# Patient Record
Sex: FEMALE | Race: WHITE | NOT HISPANIC OR LATINO | Employment: FULL TIME | URBAN - METROPOLITAN AREA
[De-identification: names, ages, dates, MRNs, and addresses within clinical notes are randomized per-mention and may not be internally consistent; named-entity substitution may affect disease eponyms.]

---

## 2021-11-04 ENCOUNTER — OFFICE VISIT (OUTPATIENT)
Dept: URGENT CARE | Facility: CLINIC | Age: 36
End: 2021-11-04

## 2021-11-04 VITALS
WEIGHT: 190 LBS | SYSTOLIC BLOOD PRESSURE: 121 MMHG | HEIGHT: 68 IN | BODY MASS INDEX: 28.79 KG/M2 | OXYGEN SATURATION: 97 % | DIASTOLIC BLOOD PRESSURE: 85 MMHG | RESPIRATION RATE: 16 BRPM | HEART RATE: 87 BPM | TEMPERATURE: 98 F

## 2021-11-04 DIAGNOSIS — Z20.822 ENCOUNTER FOR LABORATORY TESTING FOR COVID-19 VIRUS: Primary | ICD-10-CM

## 2021-11-04 DIAGNOSIS — J02.9 PHARYNGITIS, UNSPECIFIED ETIOLOGY: ICD-10-CM

## 2021-11-04 DIAGNOSIS — J32.9 SINUSITIS, UNSPECIFIED CHRONICITY, UNSPECIFIED LOCATION: ICD-10-CM

## 2021-11-04 LAB
CTP QC/QA: YES
SARS-COV-2 RDRP RESP QL NAA+PROBE: NEGATIVE

## 2021-11-04 PROCEDURE — U0002: ICD-10-PCS | Mod: QW,TIER,S$GLB, | Performed by: PHYSICIAN ASSISTANT

## 2021-11-04 PROCEDURE — U0002 COVID-19 LAB TEST NON-CDC: HCPCS | Mod: QW,TIER,S$GLB, | Performed by: PHYSICIAN ASSISTANT

## 2021-11-04 PROCEDURE — 99203 OFFICE O/P NEW LOW 30 MIN: CPT | Mod: TIER,S$GLB,, | Performed by: PHYSICIAN ASSISTANT

## 2021-11-04 PROCEDURE — 99203 PR OFFICE/OUTPT VISIT, NEW, LEVL III, 30-44 MIN: ICD-10-PCS | Mod: TIER,S$GLB,, | Performed by: PHYSICIAN ASSISTANT

## 2021-11-04 RX ORDER — PREDNISONE 20 MG/1
TABLET ORAL
Qty: 6 TABLET | Refills: 0 | Status: SHIPPED | OUTPATIENT
Start: 2021-11-04 | End: 2021-11-08

## 2021-11-04 RX ORDER — AMOXICILLIN 875 MG/1
875 TABLET, FILM COATED ORAL 2 TIMES DAILY
Qty: 14 TABLET | Refills: 0 | Status: SHIPPED | OUTPATIENT
Start: 2021-11-04 | End: 2021-11-11

## 2024-10-22 ENCOUNTER — LAB VISIT (OUTPATIENT)
Dept: LAB | Facility: HOSPITAL | Age: 39
End: 2024-10-22
Attending: STUDENT IN AN ORGANIZED HEALTH CARE EDUCATION/TRAINING PROGRAM
Payer: COMMERCIAL

## 2024-10-22 ENCOUNTER — OFFICE VISIT (OUTPATIENT)
Dept: PRIMARY CARE CLINIC | Facility: CLINIC | Age: 39
End: 2024-10-22
Payer: COMMERCIAL

## 2024-10-22 VITALS
HEART RATE: 82 BPM | BODY MASS INDEX: 32.38 KG/M2 | HEIGHT: 68 IN | WEIGHT: 213.63 LBS | SYSTOLIC BLOOD PRESSURE: 122 MMHG | DIASTOLIC BLOOD PRESSURE: 76 MMHG | OXYGEN SATURATION: 98 %

## 2024-10-22 DIAGNOSIS — Z00.00 HEALTH CARE MAINTENANCE: ICD-10-CM

## 2024-10-22 DIAGNOSIS — Z76.89 ESTABLISHING CARE WITH NEW DOCTOR, ENCOUNTER FOR: ICD-10-CM

## 2024-10-22 DIAGNOSIS — R10.13 EPIGASTRIC ABDOMINAL PAIN: ICD-10-CM

## 2024-10-22 DIAGNOSIS — R10.13 EPIGASTRIC ABDOMINAL PAIN: Primary | ICD-10-CM

## 2024-10-22 DIAGNOSIS — R19.7 DIARRHEA, UNSPECIFIED TYPE: ICD-10-CM

## 2024-10-22 DIAGNOSIS — Z12.31 ENCOUNTER FOR SCREENING MAMMOGRAM FOR MALIGNANT NEOPLASM OF BREAST: ICD-10-CM

## 2024-10-22 DIAGNOSIS — R23.2 HOT FLASHES: ICD-10-CM

## 2024-10-22 DIAGNOSIS — R10.2 PELVIC PAIN: ICD-10-CM

## 2024-10-22 LAB
25(OH)D3+25(OH)D2 SERPL-MCNC: 16 NG/ML (ref 30–96)
ALBUMIN SERPL BCP-MCNC: 3.8 G/DL (ref 3.5–5.2)
ALP SERPL-CCNC: 64 U/L (ref 40–150)
ALT SERPL W/O P-5'-P-CCNC: 25 U/L (ref 10–44)
ANION GAP SERPL CALC-SCNC: 8 MMOL/L (ref 8–16)
AST SERPL-CCNC: 19 U/L (ref 10–40)
BASOPHILS # BLD AUTO: 0.04 K/UL (ref 0–0.2)
BASOPHILS NFR BLD: 0.5 % (ref 0–1.9)
BILIRUB SERPL-MCNC: 0.2 MG/DL (ref 0.1–1)
BUN SERPL-MCNC: 12 MG/DL (ref 6–20)
CALCIUM SERPL-MCNC: 9.4 MG/DL (ref 8.7–10.5)
CHLORIDE SERPL-SCNC: 107 MMOL/L (ref 95–110)
CHOLEST SERPL-MCNC: 169 MG/DL (ref 120–199)
CHOLEST/HDLC SERPL: 3.1 {RATIO} (ref 2–5)
CO2 SERPL-SCNC: 24 MMOL/L (ref 23–29)
CREAT SERPL-MCNC: 0.7 MG/DL (ref 0.5–1.4)
DIFFERENTIAL METHOD BLD: ABNORMAL
EOSINOPHIL # BLD AUTO: 0.1 K/UL (ref 0–0.5)
EOSINOPHIL NFR BLD: 1.3 % (ref 0–8)
ERYTHROCYTE [DISTWIDTH] IN BLOOD BY AUTOMATED COUNT: 13.3 % (ref 11.5–14.5)
EST. GFR  (NO RACE VARIABLE): >60 ML/MIN/1.73 M^2
ESTIMATED AVG GLUCOSE: 94 MG/DL (ref 68–131)
FSH SERPL-ACNC: 3.74 MIU/ML
GLUCOSE SERPL-MCNC: 99 MG/DL (ref 70–110)
HBA1C MFR BLD: 4.9 % (ref 4–5.6)
HCT VFR BLD AUTO: 41 % (ref 37–48.5)
HDLC SERPL-MCNC: 55 MG/DL (ref 40–75)
HDLC SERPL: 32.5 % (ref 20–50)
HGB BLD-MCNC: 13.4 G/DL (ref 12–16)
IMM GRANULOCYTES # BLD AUTO: 0.02 K/UL (ref 0–0.04)
IMM GRANULOCYTES NFR BLD AUTO: 0.2 % (ref 0–0.5)
LDLC SERPL CALC-MCNC: 94.4 MG/DL (ref 63–159)
LIPASE SERPL-CCNC: 31 U/L (ref 4–60)
LYMPHOCYTES # BLD AUTO: 2.1 K/UL (ref 1–4.8)
LYMPHOCYTES NFR BLD: 25.4 % (ref 18–48)
MCH RBC QN AUTO: 31.9 PG (ref 27–31)
MCHC RBC AUTO-ENTMCNC: 32.7 G/DL (ref 32–36)
MCV RBC AUTO: 98 FL (ref 82–98)
MONOCYTES # BLD AUTO: 0.7 K/UL (ref 0.3–1)
MONOCYTES NFR BLD: 8.4 % (ref 4–15)
NEUTROPHILS # BLD AUTO: 5.3 K/UL (ref 1.8–7.7)
NEUTROPHILS NFR BLD: 64.2 % (ref 38–73)
NONHDLC SERPL-MCNC: 114 MG/DL
NRBC BLD-RTO: 0 /100 WBC
PLATELET # BLD AUTO: 259 K/UL (ref 150–450)
PMV BLD AUTO: 11.1 FL (ref 9.2–12.9)
POTASSIUM SERPL-SCNC: 4.3 MMOL/L (ref 3.5–5.1)
PROT SERPL-MCNC: 6.9 G/DL (ref 6–8.4)
RBC # BLD AUTO: 4.2 M/UL (ref 4–5.4)
SODIUM SERPL-SCNC: 139 MMOL/L (ref 136–145)
TRIGL SERPL-MCNC: 98 MG/DL (ref 30–150)
TSH SERPL DL<=0.005 MIU/L-ACNC: 1.18 UIU/ML (ref 0.4–4)
VIT B12 SERPL-MCNC: 267 PG/ML (ref 210–950)
WBC # BLD AUTO: 8.2 K/UL (ref 3.9–12.7)

## 2024-10-22 PROCEDURE — 3078F DIAST BP <80 MM HG: CPT | Mod: CPTII,S$GLB,, | Performed by: STUDENT IN AN ORGANIZED HEALTH CARE EDUCATION/TRAINING PROGRAM

## 2024-10-22 PROCEDURE — 82607 VITAMIN B-12: CPT | Performed by: STUDENT IN AN ORGANIZED HEALTH CARE EDUCATION/TRAINING PROGRAM

## 2024-10-22 PROCEDURE — 99385 PREV VISIT NEW AGE 18-39: CPT | Mod: S$GLB,,, | Performed by: STUDENT IN AN ORGANIZED HEALTH CARE EDUCATION/TRAINING PROGRAM

## 2024-10-22 PROCEDURE — 80061 LIPID PANEL: CPT | Performed by: STUDENT IN AN ORGANIZED HEALTH CARE EDUCATION/TRAINING PROGRAM

## 2024-10-22 PROCEDURE — 80053 COMPREHEN METABOLIC PANEL: CPT | Performed by: STUDENT IN AN ORGANIZED HEALTH CARE EDUCATION/TRAINING PROGRAM

## 2024-10-22 PROCEDURE — 83036 HEMOGLOBIN GLYCOSYLATED A1C: CPT | Performed by: STUDENT IN AN ORGANIZED HEALTH CARE EDUCATION/TRAINING PROGRAM

## 2024-10-22 PROCEDURE — 84443 ASSAY THYROID STIM HORMONE: CPT | Performed by: STUDENT IN AN ORGANIZED HEALTH CARE EDUCATION/TRAINING PROGRAM

## 2024-10-22 PROCEDURE — 86677 HELICOBACTER PYLORI ANTIBODY: CPT | Performed by: STUDENT IN AN ORGANIZED HEALTH CARE EDUCATION/TRAINING PROGRAM

## 2024-10-22 PROCEDURE — 3074F SYST BP LT 130 MM HG: CPT | Mod: CPTII,S$GLB,, | Performed by: STUDENT IN AN ORGANIZED HEALTH CARE EDUCATION/TRAINING PROGRAM

## 2024-10-22 PROCEDURE — 83690 ASSAY OF LIPASE: CPT | Performed by: STUDENT IN AN ORGANIZED HEALTH CARE EDUCATION/TRAINING PROGRAM

## 2024-10-22 PROCEDURE — 99999 PR PBB SHADOW E&M-EST. PATIENT-LVL IV: CPT | Mod: PBBFAC,,, | Performed by: STUDENT IN AN ORGANIZED HEALTH CARE EDUCATION/TRAINING PROGRAM

## 2024-10-22 PROCEDURE — 1160F RVW MEDS BY RX/DR IN RCRD: CPT | Mod: CPTII,S$GLB,, | Performed by: STUDENT IN AN ORGANIZED HEALTH CARE EDUCATION/TRAINING PROGRAM

## 2024-10-22 PROCEDURE — 36415 COLL VENOUS BLD VENIPUNCTURE: CPT | Mod: PN | Performed by: STUDENT IN AN ORGANIZED HEALTH CARE EDUCATION/TRAINING PROGRAM

## 2024-10-22 PROCEDURE — 83001 ASSAY OF GONADOTROPIN (FSH): CPT | Performed by: STUDENT IN AN ORGANIZED HEALTH CARE EDUCATION/TRAINING PROGRAM

## 2024-10-22 PROCEDURE — 86258 DGP ANTIBODY EACH IG CLASS: CPT | Performed by: STUDENT IN AN ORGANIZED HEALTH CARE EDUCATION/TRAINING PROGRAM

## 2024-10-22 PROCEDURE — 82306 VITAMIN D 25 HYDROXY: CPT | Performed by: STUDENT IN AN ORGANIZED HEALTH CARE EDUCATION/TRAINING PROGRAM

## 2024-10-22 PROCEDURE — 1159F MED LIST DOCD IN RCRD: CPT | Mod: CPTII,S$GLB,, | Performed by: STUDENT IN AN ORGANIZED HEALTH CARE EDUCATION/TRAINING PROGRAM

## 2024-10-22 PROCEDURE — 3008F BODY MASS INDEX DOCD: CPT | Mod: CPTII,S$GLB,, | Performed by: STUDENT IN AN ORGANIZED HEALTH CARE EDUCATION/TRAINING PROGRAM

## 2024-10-22 PROCEDURE — 85025 COMPLETE CBC W/AUTO DIFF WBC: CPT | Performed by: STUDENT IN AN ORGANIZED HEALTH CARE EDUCATION/TRAINING PROGRAM

## 2024-10-22 RX ORDER — PANTOPRAZOLE SODIUM 40 MG/1
40 TABLET, DELAYED RELEASE ORAL DAILY
Qty: 90 TABLET | Refills: 0 | Status: SHIPPED | OUTPATIENT
Start: 2024-10-22 | End: 2024-10-25 | Stop reason: SDUPTHER

## 2024-10-22 NOTE — PROGRESS NOTES
Kathryn Cheek  1985        Subjective     Chief Complaint: Est Care    History of Present Illness:  Ms. Kathryn Cheek is a 39 y.o. female who presents to clinic for est care.     Has noticed some diarrhea, chronic, in last 2-3 years.  Even younger had urgency. Now has about 3 Bms a day. Soft. Sometimes diarrhea. No real constipation. Dairy bothers her.  +cramping with BMs  +sometimes upper/middle abdominal pain, burning sensation, sometimes lasts hours. Unsure if specific foods bother her, does eat lots of spicy foods.  No specific ties to gluten.     No nausea/vomiting.  Has hx of hemorrhoids, sometimes flares.   No worsening fatigue, no weight loss, no rash, no joint pain.    No know family hx of colon cancer.    Daily vaping.  Occasional cigarettes.   Dad passed away with Lung Cancer.    Saw [facility] this year for pap smear.   Was told pap normal.  Cycles light. Still regular. Some mild hot flashes. Interested in checking some hormones.   Denies fam hx of breast/ovarian cancer.    BP Readings from Last 5 Encounters:   10/22/24 122/76   11/04/21 121/85     Review of Systems   Constitutional:  Positive for malaise/fatigue. Negative for chills and fever.   HENT:  Negative for congestion.    Respiratory:  Negative for cough.    Cardiovascular:  Negative for leg swelling.   Gastrointestinal:  Positive for abdominal pain and diarrhea. Negative for constipation, nausea and vomiting.   Genitourinary:  Negative for dysuria, frequency and urgency.   Neurological:  Negative for sensory change, speech change and focal weakness.   Psychiatric/Behavioral:  The patient is not nervous/anxious.         PAST HISTORY:     History reviewed. No pertinent past medical history.    History reviewed. No pertinent surgical history.    Family History   Problem Relation Name Age of Onset    No Known Problems Mother      Lung cancer Father      Skin cancer Maternal Grandmother      Cancer Paternal Grandmother           MEDICATIONS &  "ALLERGIES:     No current outpatient medications on file prior to visit.     No current facility-administered medications on file prior to visit.       Review of patient's allergies indicates:   Allergen Reactions    Sulfa (sulfonamide antibiotics) Swelling and Rash     Was told as child    Codeine Nausea And Vomiting       OBJECTIVE:     Vital Signs:  Vitals:    10/22/24 1309   BP: 122/76   BP Location: Left arm   Patient Position: Sitting   Pulse: 82   SpO2: 98%   Weight: 96.9 kg (213 lb 10 oz)   Height: 5' 8" (1.727 m)       Body mass index is 32.48 kg/m².     Physical Exam:  Physical Exam  Vitals and nursing note reviewed.   Constitutional:       General: She is not in acute distress.     Appearance: Normal appearance. She is not ill-appearing, toxic-appearing or diaphoretic.   HENT:      Head: Normocephalic and atraumatic.   Eyes:      General: No scleral icterus.        Right eye: No discharge.         Left eye: No discharge.      Conjunctiva/sclera: Conjunctivae normal.   Cardiovascular:      Rate and Rhythm: Normal rate and regular rhythm.      Heart sounds: Normal heart sounds. No murmur heard.  Pulmonary:      Effort: Pulmonary effort is normal. No respiratory distress.      Breath sounds: Normal breath sounds. No wheezing or rales.   Abdominal:      General: There is no distension.      Palpations: Abdomen is soft.      Tenderness: There is no abdominal tenderness. There is no left CVA tenderness, guarding or rebound.   Musculoskeletal:         General: Normal range of motion.      Cervical back: Normal range of motion and neck supple. No rigidity.      Right lower leg: No edema.      Left lower leg: No edema.   Lymphadenopathy:      Cervical: No cervical adenopathy.   Skin:     General: Skin is warm and dry.   Neurological:      Mental Status: She is alert and oriented to person, place, and time. Mental status is at baseline.      Gait: Gait normal.   Psychiatric:         Mood and Affect: Mood normal.    " "     Behavior: Behavior normal.            Laboratory  No results found for: "GLU", "NA", "K", "CL", "CO2", "BUN", "CREATININE", "CALCIUM", "MG"  No results found for: "HGBA1C"  No results for input(s): "POCTGLUCOSE" in the last 72 hours.        ASSESSMENT & PLAN:   Ms. Kathryn Cheek is a 39 y.o. female who was seen today in clinic for est care.     1. Epigastric abdominal pain  -     CBC Auto Differential; Future; Expected date: 10/22/2024  -     Comprehensive Metabolic Panel; Future; Expected date: 10/22/2024  -     Hemoglobin A1C; Future; Expected date: 10/22/2024  -     Lipid Panel; Future; Expected date: 10/22/2024  -     TSH; Future; Expected date: 10/22/2024  -     Vitamin D; Future; Expected date: 10/22/2024  -     Cancel: Hepatitis C Antibody; Future; Expected date: 10/22/2024  -     Cancel: HIV 1/2 Ag/Ab (4th Gen); Future; Expected date: 10/22/2024  -     VITAMIN B12; Future; Expected date: 10/22/2024  -     FOLLICLE STIMULATING HORMONE; Future; Expected date: 10/22/2024  -     H. PYLORI ANTIBODY, IGG; Future; Expected date: 10/22/2024  -     Celiac Disease Panel; Future; Expected date: 10/22/2024  -     LIPASE; Future; Expected date: 10/22/2024  -     Ambulatory referral/consult to Gastroenterology; Future; Expected date: 10/29/2024  -     pantoprazole (PROTONIX) 40 MG tablet; Take 1 tablet (40 mg total) by mouth once daily. Take 30 min before food/coffee/coke  Dispense: 90 tablet; Refill: 0    2. Establishing care with new doctor, encounter for  -     CBC Auto Differential; Future; Expected date: 10/22/2024  -     Comprehensive Metabolic Panel; Future; Expected date: 10/22/2024  -     Hemoglobin A1C; Future; Expected date: 10/22/2024  -     Lipid Panel; Future; Expected date: 10/22/2024  -     TSH; Future; Expected date: 10/22/2024  -     Vitamin D; Future; Expected date: 10/22/2024  -     Cancel: Hepatitis C Antibody; Future; Expected date: 10/22/2024  -     Cancel: HIV 1/2 Ag/Ab (4th Gen); Future; " Expected date: 10/22/2024  -     VITAMIN B12; Future; Expected date: 10/22/2024  -     FOLLICLE STIMULATING HORMONE; Future; Expected date: 10/22/2024  -     H. PYLORI ANTIBODY, IGG; Future; Expected date: 10/22/2024  -     Celiac Disease Panel; Future; Expected date: 10/22/2024  -     LIPASE; Future; Expected date: 10/22/2024    3. Diarrhea, unspecified type  -     CBC Auto Differential; Future; Expected date: 10/22/2024  -     Comprehensive Metabolic Panel; Future; Expected date: 10/22/2024  -     Hemoglobin A1C; Future; Expected date: 10/22/2024  -     Lipid Panel; Future; Expected date: 10/22/2024  -     TSH; Future; Expected date: 10/22/2024  -     Vitamin D; Future; Expected date: 10/22/2024  -     Cancel: Hepatitis C Antibody; Future; Expected date: 10/22/2024  -     Cancel: HIV 1/2 Ag/Ab (4th Gen); Future; Expected date: 10/22/2024  -     VITAMIN B12; Future; Expected date: 10/22/2024  -     FOLLICLE STIMULATING HORMONE; Future; Expected date: 10/22/2024  -     H. PYLORI ANTIBODY, IGG; Future; Expected date: 10/22/2024  -     Celiac Disease Panel; Future; Expected date: 10/22/2024  -     LIPASE; Future; Expected date: 10/22/2024  -     Ambulatory referral/consult to Gastroenterology; Future; Expected date: 10/29/2024  -     pantoprazole (PROTONIX) 40 MG tablet; Take 1 tablet (40 mg total) by mouth once daily. Take 30 min before food/coffee/coke  Dispense: 90 tablet; Refill: 0    4. Hot flashes  -     CBC Auto Differential; Future; Expected date: 10/22/2024  -     Comprehensive Metabolic Panel; Future; Expected date: 10/22/2024  -     Hemoglobin A1C; Future; Expected date: 10/22/2024  -     Lipid Panel; Future; Expected date: 10/22/2024  -     TSH; Future; Expected date: 10/22/2024  -     Vitamin D; Future; Expected date: 10/22/2024  -     Cancel: Hepatitis C Antibody; Future; Expected date: 10/22/2024  -     Cancel: HIV 1/2 Ag/Ab (4th Gen); Future; Expected date: 10/22/2024  -     VITAMIN B12; Future;  Expected date: 10/22/2024  -     FOLLICLE STIMULATING HORMONE; Future; Expected date: 10/22/2024  -     H. PYLORI ANTIBODY, IGG; Future; Expected date: 10/22/2024  -     Celiac Disease Panel; Future; Expected date: 10/22/2024  -     LIPASE; Future; Expected date: 10/22/2024  -     Ambulatory referral/consult to Obstetrics / Gynecology    5. Pelvic pain  -     US Pelvis Complete Non OB; Future; Expected date: 10/22/2024    6. Health care maintenance  -     CBC Auto Differential; Future; Expected date: 10/22/2024  -     Comprehensive Metabolic Panel; Future; Expected date: 10/22/2024  -     Hemoglobin A1C; Future; Expected date: 10/22/2024  -     Lipid Panel; Future; Expected date: 10/22/2024  -     TSH; Future; Expected date: 10/22/2024  -     Vitamin D; Future; Expected date: 10/22/2024  -     Cancel: Hepatitis C Antibody; Future; Expected date: 10/22/2024  -     Cancel: HIV 1/2 Ag/Ab (4th Gen); Future; Expected date: 10/22/2024  -     VITAMIN B12; Future; Expected date: 10/22/2024  -     FOLLICLE STIMULATING HORMONE; Future; Expected date: 10/22/2024  -     H. PYLORI ANTIBODY, IGG; Future; Expected date: 10/22/2024  -     Celiac Disease Panel; Future; Expected date: 10/22/2024  -     LIPASE; Future; Expected date: 10/22/2024  -     Ambulatory referral/consult to Obstetrics / Gynecology    7. Encounter for screening mammogram for malignant neoplasm of breast  -     Mammo Digital Screening Bilat w/ Jose D; Future; Expected date: 10/22/2024             Summer Salazar MD

## 2024-10-23 ENCOUNTER — PATIENT MESSAGE (OUTPATIENT)
Dept: PRIMARY CARE CLINIC | Facility: CLINIC | Age: 39
End: 2024-10-23
Payer: COMMERCIAL

## 2024-10-23 LAB — H PYLORI IGG SERPL QL IA: POSITIVE

## 2024-10-25 ENCOUNTER — PATIENT MESSAGE (OUTPATIENT)
Dept: PRIMARY CARE CLINIC | Facility: CLINIC | Age: 39
End: 2024-10-25

## 2024-10-25 ENCOUNTER — OFFICE VISIT (OUTPATIENT)
Dept: PRIMARY CARE CLINIC | Facility: CLINIC | Age: 39
End: 2024-10-25
Payer: COMMERCIAL

## 2024-10-25 DIAGNOSIS — A04.8 H. PYLORI INFECTION: Primary | ICD-10-CM

## 2024-10-25 DIAGNOSIS — E53.8 B12 DEFICIENCY: ICD-10-CM

## 2024-10-25 DIAGNOSIS — E55.9 VITAMIN D DEFICIENCY: ICD-10-CM

## 2024-10-25 DIAGNOSIS — R19.7 DIARRHEA, UNSPECIFIED TYPE: ICD-10-CM

## 2024-10-25 DIAGNOSIS — R10.13 EPIGASTRIC ABDOMINAL PAIN: ICD-10-CM

## 2024-10-25 RX ORDER — ERGOCALCIFEROL 1.25 MG/1
50000 CAPSULE ORAL
Qty: 12 CAPSULE | Refills: 0 | Status: SHIPPED | OUTPATIENT
Start: 2024-10-25

## 2024-10-25 RX ORDER — CLARITHROMYCIN 500 MG/1
500 TABLET, FILM COATED ORAL EVERY 12 HOURS
Qty: 28 TABLET | Refills: 0 | Status: SHIPPED | OUTPATIENT
Start: 2024-10-25 | End: 2024-11-08

## 2024-10-25 RX ORDER — PANTOPRAZOLE SODIUM 40 MG/1
40 TABLET, DELAYED RELEASE ORAL 2 TIMES DAILY
Start: 2024-10-25 | End: 2024-11-08

## 2024-10-25 RX ORDER — AMOXICILLIN 500 MG/1
1000 TABLET, FILM COATED ORAL EVERY 12 HOURS
Qty: 56 TABLET | Refills: 0 | Status: SHIPPED | OUTPATIENT
Start: 2024-10-25 | End: 2024-11-08

## 2024-10-25 NOTE — PROGRESS NOTES
The patient location is: [place]  The chief complaint leading to consultation is: H pylori    Visit type: audiovisual        Kathryn Cheek  1985        Subjective     Chief Complaint: H pylori    History of Present Illness:  Ms. Kathryn Cheek is a 39 y.o. female who presents for virtual visit for h pylori positive + symptoms.    Has not had before.   Has not yet started PPI.     +abdominal discomfort last few years.    Answers submitted by the patient for this visit:  Review of Systems Questionnaire (Submitted on 10/25/2024)  activity change: No  unexpected weight change: No  rhinorrhea: No  trouble swallowing: No  visual disturbance: No  chest tightness: No  polyuria: No  difficulty urinating: No  menstrual problem: No  joint swelling: No  arthralgias: No  confusion: No  dysphoric mood: No      Review of Systems   Constitutional:  Negative for chills, fever and malaise/fatigue.   HENT:  Negative for hearing loss.    Eyes:  Negative for discharge.   Respiratory:  Negative for wheezing.    Cardiovascular:  Negative for chest pain and palpitations.   Gastrointestinal:  Positive for diarrhea. Negative for blood in stool, constipation and vomiting.   Genitourinary:  Negative for dysuria and hematuria.   Musculoskeletal:  Negative for neck pain.   Neurological:  Negative for weakness and headaches.   Endo/Heme/Allergies:  Negative for polydipsia.        PAST HISTORY:     History reviewed. No pertinent past medical history.    History reviewed. No pertinent surgical history.    Family History   Problem Relation Name Age of Onset    No Known Problems Mother      Lung cancer Father      Skin cancer Maternal Grandmother      Cancer Paternal Grandmother           MEDICATIONS & ALLERGIES:     Current Outpatient Medications on File Prior to Visit   Medication Sig    [DISCONTINUED] pantoprazole (PROTONIX) 40 MG tablet Take 1 tablet (40 mg total) by mouth once daily. Take 30 min before food/coffee/coke     No current  "facility-administered medications on file prior to visit.       Review of patient's allergies indicates:   Allergen Reactions    Sulfa (sulfonamide antibiotics) Swelling and Rash     Was told as child    Codeine Nausea And Vomiting       OBJECTIVE:     There is no height or weight on file to calculate BMI.     Physical Exam:  Physical Exam  Constitutional:       General: She is not in acute distress.     Appearance: Normal appearance. She is not ill-appearing, toxic-appearing or diaphoretic.      Comments: Limited 2/2 Virtual Exam   HENT:      Head: Normocephalic and atraumatic.   Eyes:      Conjunctiva/sclera: Conjunctivae normal.   Pulmonary:      Effort: Pulmonary effort is normal. No respiratory distress.   Neurological:      Mental Status: She is alert and oriented to person, place, and time. Mental status is at baseline.   Psychiatric:         Mood and Affect: Mood normal.         Behavior: Behavior normal.            Laboratory  Lab Results   Component Value Date    WBC 8.20 10/22/2024    HGB 13.4 10/22/2024    HCT 41.0 10/22/2024    MCV 98 10/22/2024     10/22/2024     Lab Results   Component Value Date    GLU 99 10/22/2024     10/22/2024    K 4.3 10/22/2024     10/22/2024    CO2 24 10/22/2024    BUN 12 10/22/2024    CREATININE 0.7 10/22/2024    CALCIUM 9.4 10/22/2024     No results found for: "INR", "PROTIME"  Lab Results   Component Value Date    HGBA1C 4.9 10/22/2024             ASSESSMENT & PLAN:   Ms. Kathryn Cheek is a 39 y.o. female who was seen today for H pylori.   Triple therapy sent. Take probiotic while on meds. Avoid alcohol. Re-check stool for eradication off PPI 2-4 weeks. Has GI f/u.      1. H. pylori infection  -     amoxicillin (AMOXIL) 500 MG Tab; Take 2 tablets (1,000 mg total) by mouth every 12 (twelve) hours. for 14 days  Dispense: 56 tablet; Refill: 0  -     clarithromycin (BIAXIN) 500 MG tablet; Take 1 tablet (500 mg total) by mouth every 12 (twelve) hours. for 14 days "  Dispense: 28 tablet; Refill: 0  -     pantoprazole (PROTONIX) 40 MG tablet; Take 1 tablet (40 mg total) by mouth 2 (two) times daily. for 14 days  -     H. pylori antigen, stool; Future; Expected date: 10/25/2024    2. Epigastric abdominal pain  -     amoxicillin (AMOXIL) 500 MG Tab; Take 2 tablets (1,000 mg total) by mouth every 12 (twelve) hours. for 14 days  Dispense: 56 tablet; Refill: 0  -     clarithromycin (BIAXIN) 500 MG tablet; Take 1 tablet (500 mg total) by mouth every 12 (twelve) hours. for 14 days  Dispense: 28 tablet; Refill: 0  -     pantoprazole (PROTONIX) 40 MG tablet; Take 1 tablet (40 mg total) by mouth 2 (two) times daily. for 14 days  -     H. pylori antigen, stool; Future; Expected date: 10/25/2024    3. Diarrhea, unspecified type  -     amoxicillin (AMOXIL) 500 MG Tab; Take 2 tablets (1,000 mg total) by mouth every 12 (twelve) hours. for 14 days  Dispense: 56 tablet; Refill: 0  -     clarithromycin (BIAXIN) 500 MG tablet; Take 1 tablet (500 mg total) by mouth every 12 (twelve) hours. for 14 days  Dispense: 28 tablet; Refill: 0  -     pantoprazole (PROTONIX) 40 MG tablet; Take 1 tablet (40 mg total) by mouth 2 (two) times daily. for 14 days  -     H. pylori antigen, stool; Future; Expected date: 10/25/2024    4. Vitamin D deficiency  -     ergocalciferol (VITAMIN D2) 50,000 unit Cap; Take 1 capsule (50,000 Units total) by mouth every 7 days.  Dispense: 12 capsule; Refill: 0    5. B12 deficiency           Summer Salazar MD  Internal Medicine          Face to Face time with patient: 12  15 minutes of total time spent on the encounter, which includes face to face time and non-face to face time preparing to see the patient (eg, review of tests), Obtaining and/or reviewing separately obtained history, Documenting clinical information in the electronic or other health record, Independently interpreting results (not separately reported) and communicating results to the patient/family/caregiver, or Care  coordination (not separately reported).         Each patient to whom he or she provides medical services by telemedicine is:  (1) informed of the relationship between the physician and patient and the respective role of any other health care provider with respect to management of the patient; and (2) notified that he or she may decline to receive medical services by telemedicine and may withdraw from such care at any time.    Portions of this note may have been generated using voice recognition software.  Please excuse any spelling/grammatical errors. Occasional wrong-word or sound-a-like substitutions may have also occurred due to the inherent limitations of voice recognition software. Read the chart carefully and recognize, using context, where substitutions have occurred.

## 2024-10-28 ENCOUNTER — HOSPITAL ENCOUNTER (OUTPATIENT)
Dept: RADIOLOGY | Facility: HOSPITAL | Age: 39
Discharge: HOME | End: 2024-10-28
Attending: STUDENT IN AN ORGANIZED HEALTH CARE EDUCATION/TRAINING PROGRAM
Payer: COMMERCIAL

## 2024-10-28 DIAGNOSIS — R10.2 PELVIC PAIN: ICD-10-CM

## 2024-10-28 LAB
GLIADIN PEPTIDE IGA SER-ACNC: 0.6 U/ML
GLIADIN PEPTIDE IGG SER-ACNC: <0.6 U/ML
IGA SERPL-MCNC: 277 MG/DL (ref 70–400)
TTG IGA SER-ACNC: 1.2 U/ML
TTG IGG SER-ACNC: <0.6 U/ML

## 2024-10-28 PROCEDURE — 76830 TRANSVAGINAL US NON-OB: CPT | Mod: 26,,, | Performed by: INTERNAL MEDICINE

## 2024-10-28 PROCEDURE — 76856 US EXAM PELVIC COMPLETE: CPT | Mod: TC

## 2024-10-28 PROCEDURE — 76856 US EXAM PELVIC COMPLETE: CPT | Mod: 26,,, | Performed by: INTERNAL MEDICINE

## 2024-10-28 PROCEDURE — 76830 TRANSVAGINAL US NON-OB: CPT | Mod: TC

## 2024-10-29 DIAGNOSIS — N84.1 CERVICAL POLYP: Primary | ICD-10-CM

## 2024-11-12 ENCOUNTER — PATIENT MESSAGE (OUTPATIENT)
Dept: PRIMARY CARE CLINIC | Facility: CLINIC | Age: 39
End: 2024-11-12
Payer: COMMERCIAL

## 2024-12-12 ENCOUNTER — TELEPHONE (OUTPATIENT)
Dept: GASTROENTEROLOGY | Facility: CLINIC | Age: 39
End: 2024-12-12
Payer: COMMERCIAL

## 2024-12-13 ENCOUNTER — OFFICE VISIT (OUTPATIENT)
Dept: GASTROENTEROLOGY | Facility: CLINIC | Age: 39
End: 2024-12-13
Payer: COMMERCIAL

## 2024-12-13 VITALS
DIASTOLIC BLOOD PRESSURE: 76 MMHG | BODY MASS INDEX: 32.35 KG/M2 | WEIGHT: 212.75 LBS | HEART RATE: 87 BPM | SYSTOLIC BLOOD PRESSURE: 120 MMHG

## 2024-12-13 DIAGNOSIS — R19.7 DIARRHEA, UNSPECIFIED TYPE: ICD-10-CM

## 2024-12-13 DIAGNOSIS — A04.8 H. PYLORI INFECTION: Primary | ICD-10-CM

## 2024-12-13 DIAGNOSIS — R10.13 EPIGASTRIC ABDOMINAL PAIN: ICD-10-CM

## 2024-12-13 PROCEDURE — 99999 PR PBB SHADOW E&M-EST. PATIENT-LVL III: CPT | Mod: PBBFAC,,,

## 2024-12-13 RX ORDER — PANTOPRAZOLE SODIUM 40 MG/1
40 TABLET, DELAYED RELEASE ORAL DAILY
Qty: 90 TABLET | Refills: 1
Start: 2024-12-13 | End: 2025-06-11

## 2024-12-13 NOTE — PROGRESS NOTES
Gastroenterology Clinic Consultation Note    Reason for Visit:  The primary encounter diagnosis was H. pylori infection. Diagnoses of Epigastric abdominal pain and Diarrhea, unspecified type were also pertinent to this visit.    PCP:   Summer Salazar   [address] [facility] / [place] [address]    Initial HPI   This is a 39 y.o. female presenting for epigastric pain. Was referred by her PCP. Last visit with her on was on 10/22 where she presented with epigastric burning that started approximately 1 year ago and had since progressively worsened along with non bloody vomiting. She did also report during this visit ongoing issues of diarrhea with associated abdominal cramping. H.pylori testing performed at this visit (1 month ago) was positive. She was treated with triple therapy antibiotic regimen for eradication. She has since completed this course with no issues. Has not yet had stool retesting to confirm eradication. Plans on doing this some time next week or so as advised by her PCP.     Today, she presents with mild ongoing symptoms. She felt symptoms had resolved some up until a few days ago. She experiences frequent episodes of morning reflux. She did experience an episode of NBNB vomiting about 4 days ago. She has also experienced an episode of epigastric burning. She has come off of daily PPI as advised by her PCP prior to re testing of stool but does admit to taking a few doses over the last week for breakthrough reflux symptoms. She is also taking tums when needed.     Denies any dysphagia, odynophagia. No changes in bowel pattern, blood/ mucus in stool or unintentional weight loss. Nocturnal symptoms. No melena or maroon stools. No recent changes in diet or medications. No family history of IBD, Celiac disease or GI malignancy. No regular NSAIDs. No recent antibiotic use, travels or sick contacts. No prior history of C.diff.    Interval History  Patient presents longstanding history (10+ years) of GI  related issues. Says she recalls being hospitalized a few times as a child for these issues. She experiences symptoms of bowel urgency with looser stools following meals. Produces a bowel movement daily, mostly loose in consistency. She has always associated these issues with foods she eats. She is unsure if she has food sensitivity. Checked for celiac which was negative. Has not seen GI for these issues.  Has tried probiotic in the past but admits to not be consistent with taking this.    Binge drinks alcohol approximately 4-5 times a week. She works as a . She is former cigarette smoker but currently vapes daily.     She is also concerned for gallstones as her mom has a history of these issues.     She does also endorse lower pelvic discomfort which she associates from cyst found on uterus. She had follow up appointment with GYN but had to reschedule this.     Abdominal Surgeries: None    ROS:  Review of Systems   Constitutional:  Negative for chills, fever, malaise/fatigue and weight loss.   Respiratory:  Negative for cough, hemoptysis, sputum production, shortness of breath and wheezing.    Cardiovascular:  Negative for chest pain, palpitations, orthopnea, claudication, leg swelling and PND.   Gastrointestinal:  Positive for abdominal pain, diarrhea, heartburn and nausea. Negative for blood in stool, constipation, melena and vomiting.   Genitourinary:  Negative for dysuria, flank pain, frequency, hematuria and urgency.   Musculoskeletal:  Negative for back pain, falls, joint pain, myalgias and neck pain.   Skin:  Negative for itching and rash.   Neurological:  Negative for dizziness, seizures, loss of consciousness, weakness and headaches.   Psychiatric/Behavioral:  The patient is not nervous/anxious and does not have insomnia.       Medical History:  has no past medical history on file.    Surgical History:  has no past surgical history on file.    Family History: family history includes Cancer in her  paternal grandmother; Lung cancer in her father; No Known Problems in her mother; Skin cancer in her maternal grandmother..     Review of patient's allergies indicates:   Allergen Reactions    Sulfa (sulfonamide antibiotics) Swelling and Rash     Was told as child    Codeine Nausea And Vomiting     Current Outpatient Medications on File Prior to Visit   Medication Sig Dispense Refill    ergocalciferol (VITAMIN D2) 50,000 unit Cap Take 1 capsule (50,000 Units total) by mouth every 7 days. 12 capsule 0    [DISCONTINUED] pantoprazole (PROTONIX) 40 MG tablet Take 1 tablet (40 mg total) by mouth 2 (two) times daily. for 14 days       No current facility-administered medications on file prior to visit.     Objective Findings:    Vital Signs:  /76   Pulse 87   Wt 96.5 kg (212 lb 11.9 oz)   BMI 32.35 kg/m²   Body mass index is 32.35 kg/m².    Physical Exam  Constitutional:       Appearance: Normal appearance. She is obese.   HENT:      Head: Normocephalic and atraumatic.      Nose: Nose normal.      Mouth/Throat:      Mouth: Mucous membranes are moist.      Pharynx: Oropharynx is clear.   Cardiovascular:      Rate and Rhythm: Normal rate and regular rhythm.      Pulses: Normal pulses.      Heart sounds: Normal heart sounds.   Pulmonary:      Effort: Pulmonary effort is normal.      Breath sounds: Normal breath sounds.   Abdominal:      General: Bowel sounds are normal.      Palpations: Abdomen is soft.   Musculoskeletal:         General: Normal range of motion.      Cervical back: Normal range of motion and neck supple.   Skin:     General: Skin is warm and dry.   Neurological:      General: No focal deficit present.      Mental Status: She is alert and oriented to person, place, and time.   Psychiatric:         Mood and Affect: Mood normal.         Behavior: Behavior normal.         Thought Content: Thought content normal.         Judgment: Judgment normal.     Labs:  Lab Results   Component Value Date    WBC 8.20  10/22/2024    HGB 13.4 10/22/2024    HCT 41.0 10/22/2024     10/22/2024    CHOL 169 10/22/2024    TRIG 98 10/22/2024    HDL 55 10/22/2024    ALKPHOS 64 10/22/2024    LIPASE 31 10/22/2024    ALT 25 10/22/2024    AST 19 10/22/2024     10/22/2024    K 4.3 10/22/2024     10/22/2024    CREATININE 0.7 10/22/2024    BUN 12 10/22/2024    CO2 24 10/22/2024    TSH 1.183 10/22/2024    HGBA1C 4.9 10/22/2024     Imaging reviewed:   No recent/relevant GI imaging performed for review.     Endoscopy reviewed:   Never done    Assessment:  1. H. pylori infection    2. Epigastric abdominal pain    3. Diarrhea, unspecified type      Orders Placed This Encounter    US Abdomen Complete    pantoprazole (PROTONIX) 40 MG tablet     Plan:  US abdomen   2. Advised patient stay off of her PPI for time being and re collect stool in two weeks for more accurate results to confirm eradication of h.pylori infection. If still active, will restart abx therapy.   3. Once stool is collected, patient can restart pantoprazole 40 mg daily. Advised she takes this 30-45 minutes prior to breakfast.   4. In the meantime, can take as needed famotidine or tums at night before bed to manage breakthrough symptoms.   4. Discussed dietary and lifestyle modifications to reduce symptoms. This information will be included in her AVS.  5. Did also discuss daily over the counter probiotic (Florastor/Ibgard) for bowel issues.   6. F/u with GI in 3 months; if symptoms persist despite negative h.pylori testing, will consider EGD at that time for further investigation.       Thank you for allowing me to participate in this patient's care.    Sincerely,     ROGELIO FLAHERTY  Gastroenterology Department  [facility]

## 2024-12-13 NOTE — PATIENT INSTRUCTIONS
- Submit stool sample after being off of pantoprazole for approximately 2 weeks to eliminate false negative results.  - In the meantime, can take over the counter famotidine (Pepcid) before bed or tums as needed for breakthrough symptoms.   - Once stool specimen is collected, you can restart pantoprazole 40 mg daily preferably 30-45 minutes prior to breakfast or dinner if afternoon dosing is more compatible with work schedule.   - I would also advise either daily fiber supplement (Metamucil, Benefiber, Citrucel) OR daily probiotic (Ibgard, Florastor) for bowel issues.       For GERD/Reflux:     Take your PPI 30-45 minutes before your first protein containing meal (breakfast) every day. Take once daily for 8 weeks. If symptoms improve ok discontinue an use PPI as needed for symptoms.      Take Pepcid 20mg in the evening before bedtime to help with nocturnal symptoms, as needed.     Remain upright for at least 3 hours after eating.      Elevate the head of the bed for nighttime.      Avoid foods that you have noticed make your symptoms worse (possible triggers include: peppermint, alcohol, chocolate, caffeine, spicy foods, greasy/fried foods, acidic foods-citrus).     GERD Treatment: Lifestyle and Dietary Changes    Dietary and lifestyle changes are the first step in treating GERD. Certain foods make the reflux worse. Suggestions to help alleviate symptoms include:  Lose weight if you are overweight -- of all of the lifestyle changes you can make, this one is the most effective.  Avoid foods that increase the level of acid in your stomach, including caffeinated beverages.  Avoid foods that decrease the pressure in the lower esophagus, such as fatty foods, alcohol and peppermint.  Avoid foods that affect peristalsis (the muscle movements in your digestive tract), such as coffee, alcohol and acidic liquids.  Avoid foods that slow gastric emptying, including fatty foods.  Avoid large meals.  Quit smoking.  Do not lie  down immediately after a meal.  Elevate the level of your head when you lie down.    Foods That May Cause Heartburn  Foods commonly known to be heartburn triggers cause the esophageal sphincter to relax and delay the digestive process, letting food sit in the stomach longer, says Priya. The worst culprits? Foods that are high in fat, salt or spice such as:  Fried food  Fast food  Pizza  Potato chips and other processed snacks  Chili powder and pepper (white, black, cayenne)  Fatty meats such as russ and sausage  Cheese  Other foods that can cause the same problem include:  Tomato-based sauces  Citrus fruits  Chocolate  Peppermint  Carbonated beverages    Moderation is key since many people may not be able to or want to completely eliminate these foods. But try to avoid eating problem foods late in the evening closer to bedtime, so they're not sitting in your stomach and then coming up your esophagus when you lay down at night. It's also a good idea to eat small frequent meals instead of bigger, heavier meals and avoid late-night dinners and bedtime snacks.    Foods That Help Prevent Acid Reflux  Good news: There are plenty of things you can eat to help prevent acid reflux. Stock your kitchen with foods from these three categories:    High-fiber foods  Fibrous foods make you feel full so you're less likely to overeat, which may contribute to heartburn. So, load up on healthy fiber from these foods:  Whole grains such as oatmeal, couscous and brown rice.  Root vegetables such as sweet potatoes, carrots and beets.  Green vegetables such as asparagus, broccoli and green beans.    Alkaline foods  Foods fall somewhere along the pH scale (an indicator of acid levels). Those that have a low pH are acidic and more likely to cause reflux. Those with higher pH are alkaline and can help offset strong stomach acid. Alkaline foods include:  Bananas  Melons  Cauliflower  Fennel  Nuts    Watery foods  Eating foods that contain a  lot of water can dilute and weaken stomach acid. Choose foods such as:  Celery  Cucumber  Lettuce  Watermelon  Broth-based soups  Herbal tea

## 2025-04-02 ENCOUNTER — TELEPHONE (OUTPATIENT)
Dept: GASTROENTEROLOGY | Facility: CLINIC | Age: 40
End: 2025-04-02
Payer: COMMERCIAL

## 2025-04-03 ENCOUNTER — TELEPHONE (OUTPATIENT)
Dept: GASTROENTEROLOGY | Facility: CLINIC | Age: 40
End: 2025-04-03
Payer: COMMERCIAL